# Patient Record
Sex: MALE | Race: WHITE | NOT HISPANIC OR LATINO
[De-identification: names, ages, dates, MRNs, and addresses within clinical notes are randomized per-mention and may not be internally consistent; named-entity substitution may affect disease eponyms.]

---

## 2024-11-22 ENCOUNTER — DASHBOARD ENCOUNTERS (OUTPATIENT)
Age: 24
End: 2024-11-22

## 2024-11-22 ENCOUNTER — LAB OUTSIDE AN ENCOUNTER (OUTPATIENT)
Dept: URBAN - METROPOLITAN AREA CLINIC 23 | Facility: CLINIC | Age: 24
End: 2024-11-22

## 2024-11-22 ENCOUNTER — OFFICE VISIT (OUTPATIENT)
Dept: URBAN - METROPOLITAN AREA CLINIC 23 | Facility: CLINIC | Age: 24
End: 2024-11-22
Payer: COMMERCIAL

## 2024-11-22 VITALS
TEMPERATURE: 98.2 F | DIASTOLIC BLOOD PRESSURE: 74 MMHG | WEIGHT: 216 LBS | HEIGHT: 70 IN | BODY MASS INDEX: 30.92 KG/M2 | SYSTOLIC BLOOD PRESSURE: 116 MMHG | HEART RATE: 60 BPM

## 2024-11-22 DIAGNOSIS — K21.9 GERD: ICD-10-CM

## 2024-11-22 DIAGNOSIS — R07.9 CHEST PAIN: ICD-10-CM

## 2024-11-22 PROCEDURE — 99204 OFFICE O/P NEW MOD 45 MIN: CPT | Performed by: INTERNAL MEDICINE

## 2024-11-22 NOTE — HPI-TODAY'S VISIT:
4 years old male presents for follow-up after recent ER visit for chest pain - Acute episode of palpitations and chest pain on 11/14/2024 at 11:30 PM while watching TV - Reports heart racing, shaking, hyperventilation, facial flushing, clamminess, numbness in chest - Presented to [facility] ER - Reports occasional heartburn and reflux symptoms - Requires water with meals, sensation of food sticking - Family history: Brother with eosinophilic esophagitis, father with GERD s/p surgery  - ER evaluation on 11/14/2024: - EKG performed - Serial cardiac enzymes at 0 and 2 hours: negative - Received IV fluids and Pepcid - Home medications taken: Xanax 0.5mg during episode

## 2024-12-27 ENCOUNTER — OFFICE VISIT (OUTPATIENT)
Dept: URBAN - METROPOLITAN AREA LAB 3 | Facility: LAB | Age: 24
End: 2024-12-27

## 2025-01-06 ENCOUNTER — OFFICE VISIT (OUTPATIENT)
Dept: URBAN - METROPOLITAN AREA CLINIC 23 | Facility: CLINIC | Age: 25
End: 2025-01-06

## 2025-01-29 ENCOUNTER — TELEPHONE ENCOUNTER (OUTPATIENT)
Dept: URBAN - METROPOLITAN AREA CLINIC 23 | Facility: CLINIC | Age: 25
End: 2025-01-29

## 2025-03-31 ENCOUNTER — OFFICE VISIT (OUTPATIENT)
Dept: URBAN - METROPOLITAN AREA SURGERY CENTER 15 | Facility: SURGERY CENTER | Age: 25
End: 2025-03-31

## 2025-04-30 ENCOUNTER — LAB OUTSIDE AN ENCOUNTER (OUTPATIENT)
Dept: URBAN - METROPOLITAN AREA CLINIC 23 | Facility: CLINIC | Age: 25
End: 2025-04-30

## 2025-04-30 ENCOUNTER — OFFICE VISIT (OUTPATIENT)
Dept: URBAN - METROPOLITAN AREA CLINIC 23 | Facility: CLINIC | Age: 25
End: 2025-04-30
Payer: COMMERCIAL

## 2025-04-30 DIAGNOSIS — R10.10 UPPER ABDOMINAL PAIN: ICD-10-CM

## 2025-04-30 PROCEDURE — 99213 OFFICE O/P EST LOW 20 MIN: CPT

## 2025-04-30 NOTE — HPI-TODAY'S VISIT:
24 yr old male here for follow up after EGD.   Last seen in clinic 11/2024 by Dr. Valente for heartburn.  He underwent EGD March 2025 which showed normal esophagus, erythematous mucosa in stomach and normal duodenum.  Biopsy showed proton pump inhibitor effect in stomach.  Negative for Cruz's, EOE, H. pylori and celiac.  Today, he is here with his mom.  He states that he stopped taking over-the-counter Nexium and Pepcid last week.  He states that these medications did help with symptoms.  He reports burning chest discomfort and tightness that occurs mostly in the middle of the day.  These episodes can occur while exercising.  He eats 5-7 meals per day and has a healthy diet.  He has not been able to identify any specific triggers for upper abdominal discomfort.

## 2025-05-19 ENCOUNTER — LAB OUTSIDE AN ENCOUNTER (OUTPATIENT)
Dept: URBAN - METROPOLITAN AREA CLINIC 23 | Facility: CLINIC | Age: 25
End: 2025-05-19

## 2025-05-27 ENCOUNTER — TELEPHONE ENCOUNTER (OUTPATIENT)
Dept: URBAN - METROPOLITAN AREA CLINIC 23 | Facility: CLINIC | Age: 25
End: 2025-05-27